# Patient Record
Sex: MALE | Race: WHITE | NOT HISPANIC OR LATINO | Employment: UNEMPLOYED | URBAN - NONMETROPOLITAN AREA
[De-identification: names, ages, dates, MRNs, and addresses within clinical notes are randomized per-mention and may not be internally consistent; named-entity substitution may affect disease eponyms.]

---

## 2022-08-24 ENCOUNTER — HOSPITAL ENCOUNTER (EMERGENCY)
Facility: HOSPITAL | Age: 37
Discharge: LEFT BEFORE DISCHARGE | End: 2022-08-24
Attending: EMERGENCY MEDICINE | Admitting: EMERGENCY MEDICINE

## 2022-08-24 ENCOUNTER — HOSPITAL ENCOUNTER (EMERGENCY)
Facility: HOSPITAL | Age: 37
Discharge: HOME | End: 2022-08-24
Attending: EMERGENCY MEDICINE | Admitting: EMERGENCY MEDICINE

## 2022-08-24 VITALS
DIASTOLIC BLOOD PRESSURE: 58 MMHG | SYSTOLIC BLOOD PRESSURE: 117 MMHG | OXYGEN SATURATION: 100 % | RESPIRATION RATE: 20 BRPM | HEART RATE: 76 BPM | WEIGHT: 175 LBS | TEMPERATURE: 98.4 F | BODY MASS INDEX: 24.5 KG/M2 | HEIGHT: 71 IN

## 2022-08-24 DIAGNOSIS — T40.1X1A ACCIDENTAL OVERDOSE OF HEROIN, INITIAL ENCOUNTER: Primary | ICD-10-CM

## 2022-08-24 DIAGNOSIS — F19.10 POLYSUBSTANCE ABUSE: Primary | ICD-10-CM

## 2022-08-24 LAB
AMPHET+METHAMPHET UR QL: POSITIVE
AMPHETAMINES UR QL: POSITIVE
BARBITURATES UR QL SCN: NEGATIVE
BENZODIAZ UR QL SCN: NEGATIVE
BILIRUB UR QL STRIP: NEGATIVE
BUPRENORPHINE SERPL-MCNC: NEGATIVE NG/ML
CANNABINOIDS SERPL QL: NEGATIVE
CLARITY UR: CLEAR
COCAINE UR QL: NEGATIVE
COLOR UR: YELLOW
GLUCOSE UR STRIP-MCNC: NEGATIVE MG/DL
HGB UR QL STRIP.AUTO: NEGATIVE
KETONES UR QL STRIP: NEGATIVE
LEUKOCYTE ESTERASE UR QL STRIP.AUTO: NEGATIVE
METHADONE UR QL SCN: NEGATIVE
NITRITE UR QL STRIP: NEGATIVE
OPIATES UR QL: NEGATIVE
OXYCODONE UR QL SCN: NEGATIVE
PCP UR QL SCN: NEGATIVE
PH UR STRIP.AUTO: 6 [PH] (ref 5–8)
PROPOXYPH UR QL: NEGATIVE
PROT UR QL STRIP: NEGATIVE
SP GR UR STRIP: 1.01 (ref 1–1.03)
TRICYCLICS UR QL SCN: NEGATIVE
UROBILINOGEN UR QL STRIP: NORMAL

## 2022-08-24 PROCEDURE — 25010000002 NALOXONE HCL 2 MG/2ML SOLUTION PREFILLED SYRINGE: Performed by: EMERGENCY MEDICINE

## 2022-08-24 PROCEDURE — 99282 EMERGENCY DEPT VISIT SF MDM: CPT

## 2022-08-24 PROCEDURE — 80306 DRUG TEST PRSMV INSTRMNT: CPT | Performed by: EMERGENCY MEDICINE

## 2022-08-24 PROCEDURE — 96374 THER/PROPH/DIAG INJ IV PUSH: CPT

## 2022-08-24 PROCEDURE — 81003 URINALYSIS AUTO W/O SCOPE: CPT | Performed by: EMERGENCY MEDICINE

## 2022-08-24 PROCEDURE — 99284 EMERGENCY DEPT VISIT MOD MDM: CPT

## 2022-08-24 PROCEDURE — 96375 TX/PRO/DX INJ NEW DRUG ADDON: CPT

## 2022-08-24 PROCEDURE — P9612 CATHETERIZE FOR URINE SPEC: HCPCS

## 2022-08-24 RX ORDER — NICOTINE 21 MG/24HR
1 PATCH, TRANSDERMAL 24 HOURS TRANSDERMAL
Status: DISCONTINUED | OUTPATIENT
Start: 2022-08-24 | End: 2022-08-25 | Stop reason: HOSPADM

## 2022-08-24 RX ORDER — FLUMAZENIL 0.1 MG/ML
0.4 INJECTION INTRAVENOUS ONCE
Status: COMPLETED | OUTPATIENT
Start: 2022-08-24 | End: 2022-08-24

## 2022-08-24 RX ORDER — NALOXONE HYDROCHLORIDE 1 MG/ML
2 INJECTION INTRAMUSCULAR; INTRAVENOUS; SUBCUTANEOUS ONCE
Status: COMPLETED | OUTPATIENT
Start: 2022-08-24 | End: 2022-08-24

## 2022-08-24 RX ORDER — NALOXONE HCL 0.4 MG/ML
1 VIAL (ML) INJECTION ONCE
Status: DISCONTINUED | OUTPATIENT
Start: 2022-08-24 | End: 2022-08-25 | Stop reason: HOSPADM

## 2022-08-24 RX ADMIN — FLUMAZENIL 0.4 MG: 0.1 INJECTION, SOLUTION INTRAVENOUS at 17:22

## 2022-08-24 RX ADMIN — NICOTINE 1 PATCH: 14 PATCH, EXTENDED RELEASE TRANSDERMAL at 19:42

## 2022-08-24 RX ADMIN — NALOXONE HYDROCHLORIDE 2 MG: 1 INJECTION PARENTERAL at 16:56

## 2022-08-24 RX ADMIN — NALOXONE HYDROCHLORIDE 2 MG: 1 INJECTION PARENTERAL at 16:59

## 2022-08-24 NOTE — ED PROVIDER NOTES
Subjective   Patient was found in restroom passed out      Altered Mental Status  Presenting symptoms: confusion and unresponsiveness    Severity:  Severe  Most recent episode:  Today  Episode history:  Single      Review of Systems   Constitutional: Positive for activity change and fatigue.   HENT: Negative.    Eyes: Negative.    Respiratory: Positive for shortness of breath.    Cardiovascular: Negative.    Gastrointestinal: Negative.    Endocrine: Negative.    Genitourinary: Negative.    Musculoskeletal: Negative.    Skin: Negative.    Allergic/Immunologic: Negative.    Neurological: Negative.    Hematological: Negative.    Psychiatric/Behavioral: Positive for confusion, decreased concentration and dysphoric mood. Negative for suicidal ideas.       No past medical history on file.    Allergies   Allergen Reactions   • Penicillins Unknown - High Severity     Pt unsure of reaction       No past surgical history on file.    No family history on file.    Social History     Socioeconomic History   • Marital status: Unknown           Objective   Physical Exam  Vitals and nursing note reviewed.   HENT:      Head: Normocephalic.      Right Ear: Tympanic membrane normal.      Nose: Nose normal.      Mouth/Throat:      Mouth: Mucous membranes are moist.   Eyes:      Comments: Pupils pinpoint   Cardiovascular:      Rate and Rhythm: Normal rate.      Pulses: Normal pulses.   Pulmonary:      Effort: Pulmonary effort is normal.   Abdominal:      General: Abdomen is flat.   Musculoskeletal:         General: Normal range of motion.      Cervical back: Normal range of motion.   Skin:     General: Skin is warm.      Capillary Refill: Capillary refill takes less than 2 seconds.   Neurological:      Mental Status: He is alert.      Comments: Unresponsive, awoke with narcan 2mg IV         Procedures           ED Course                                           MDM    Final diagnoses:   Accidental overdose of heroin, initial encounter  (HCC)       ED Disposition  ED Disposition     ED Disposition   AMA    Condition   --    Comment   --             No follow-up provider specified.       Medication List      No changes were made to your prescriptions during this visit.          Rafael Pierre MD  08/24/22 9128       Rafael Pierre MD  08/24/22 4040

## 2022-08-24 NOTE — DISCHARGE INSTRUCTIONS
Call one of the offices below to establish a primary care provider.  If you are unable to get an appointment and feel it is an emergency and need to be seen immediately please return to the Emergency Department.    Call one of the office below to set up a primary care provider.    Dr. Bib Angeles                                                                                                       [address] [facility] [address]  [phone]    Dr. Muniz, Dr. LAURIE Spicer, Dr. DALIA Spicer ([facility])  [address] [facility] [address]  [phone]    Dr. Conley, Dr. Koch, Dr. Garza ([facility])  [address] [facility] [address]  [phone]    Dr. Maciel  [address] [facility] [address]  [phone]    Dr. Coronado, Dr. Garrett, Dr. Salcido, Dr. Wills ([facility])  [address] [place] [address] [address]  [facility] [address]  [phone]    Dr. Janneth Enrique  [address] [facility] [place] [address]  [phone]    Dr. Pennie Tucker  [address]   [address]  [facility] [address]  [phone]    Dr. Angeles  [address] [facility] [address]  [phone]    Dr. Garner, Dr. Wilder  [address] [facility] [place] [address]  [phone]    Dr. Whitt  [address]                                                              [address]  [facility] [address]  [phone]    Dr. Lopez  [address] [facility] [address]  [phone]    Dr. Chelsea Pederson  [address]  [address]  [place] [address]  [phone]    Dr. [facility]   [address] [facility]  [address]  [place] [address]  [phone]

## 2022-08-24 NOTE — NURSING NOTE
Pt arrived in intake, was very callous, refused to empty his pockets, then began emptying his pockets very carefully, one item at a time, requested to go the bathroom and refused to allow staff in to do a safety search, states he is here for detox and and came willingly and would not be treated as if he was in correction, pt kept reaching for his rectum and attempting to stick his hands in his pants, as if he had something hidden in his pants. Pt became very angry and stated he no longer wanted to stay for detox, he wanted to go.  Pt adamantly denied SI/HI/AVH, I notified Dr. Delgado and Dr. Pierre, new orders to discharge the patient at this time.
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
## 2022-08-25 NOTE — ED PROVIDER NOTES
Subjective   Found in restroom unresponsive with pinpoint pupil, brought to ER and given IV narcan, to which he responded by waking up      Altered Mental Status  Presenting symptoms: unresponsiveness    Severity:  Severe  Most recent episode:  Today  Episode history:  Single  Chronicity:  Recurrent  Context: drug use        Review of Systems   Constitutional: Positive for activity change and fatigue.   HENT: Negative.    Eyes: Negative.    Respiratory: Negative.    Cardiovascular: Negative.    Gastrointestinal: Negative.    Endocrine: Negative.    Genitourinary: Negative.    Musculoskeletal: Negative.    Skin: Negative.    Allergic/Immunologic: Negative.    Neurological: Negative.    Hematological: Negative.        No past medical history on file.    Allergies   Allergen Reactions   • Penicillins Unknown - High Severity     Pt unsure of reaction       No past surgical history on file.    No family history on file.    Social History     Socioeconomic History   • Marital status: Unknown           Objective   Physical Exam  Vitals and nursing note reviewed.   HENT:      Head: Normocephalic.      Nose: Nose normal.      Mouth/Throat:      Mouth: Mucous membranes are moist.   Eyes:      Comments: Pinpoint pupils   Cardiovascular:      Rate and Rhythm: Normal rate.      Pulses: Normal pulses.   Pulmonary:      Effort: Pulmonary effort is normal.   Abdominal:      General: Abdomen is flat.   Musculoskeletal:         General: Normal range of motion.      Cervical back: Normal range of motion.   Skin:     General: Skin is warm.      Capillary Refill: Capillary refill takes less than 2 seconds.   Neurological:      Mental Status: He is alert.      Comments: Unresponsiveness responded to IV Narcan         Procedures           ED Course                                           MDM    Final diagnoses:   Polysubstance abuse (HCC)       ED Disposition  ED Disposition     ED Disposition   Discharge    Condition   Stable     Comment   --             No follow-up provider specified.       Medication List      No changes were made to your prescriptions during this visit.          Rafael Pierre MD  08/25/22 1259
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
## 2022-08-25 NOTE — NURSING NOTE
"Pt handed all discharge paperwork to RN, stating, \"Throw this away. I don't want it.\" Security escorted pt from Intake dept at this time.  "

## 2022-08-25 NOTE — NURSING NOTE
Pt demanding to leave at this time. Pt does not want to be assessed, does not want to be admitted for Detox. Pt yelling at staff and Security. Pt denies any SI/HI/AVH.

## 2022-08-25 NOTE — NURSING NOTE
Spoke with Dr. Brewster at this time. Explained pt situation to Provider. Orders obtained from Dr. Brewster to discharge pt AMA. Dr. Pierre in agreement. Benefits of treatment and risks of AMA discharge explained to pt.